# Patient Record
Sex: MALE | Race: WHITE | NOT HISPANIC OR LATINO | Employment: OTHER | URBAN - NONMETROPOLITAN AREA
[De-identification: names, ages, dates, MRNs, and addresses within clinical notes are randomized per-mention and may not be internally consistent; named-entity substitution may affect disease eponyms.]

---

## 2020-01-17 ENCOUNTER — HISTORICAL (OUTPATIENT)
Dept: ADMINISTRATIVE | Facility: HOSPITAL | Age: 65
End: 2020-01-17

## 2022-05-11 ENCOUNTER — HISTORICAL (OUTPATIENT)
Dept: ADMINISTRATIVE | Facility: HOSPITAL | Age: 67
End: 2022-05-11

## 2022-06-14 DIAGNOSIS — N20.0 LEFT NEPHROLITHIASIS: Primary | ICD-10-CM

## 2022-07-28 ENCOUNTER — HOSPITAL ENCOUNTER (OUTPATIENT)
Dept: RADIOLOGY | Facility: CLINIC | Age: 67
Discharge: HOME | End: 2022-07-28
Attending: NURSE PRACTITIONER
Payer: MEDICARE

## 2022-07-28 ENCOUNTER — OFFICE VISIT (OUTPATIENT)
Dept: UROLOGY | Facility: CLINIC | Age: 67
End: 2022-07-28
Payer: MEDICARE

## 2022-07-28 VITALS — WEIGHT: 161 LBS | RESPIRATION RATE: 20 BRPM | BODY MASS INDEX: 22.54 KG/M2 | HEIGHT: 71 IN

## 2022-07-28 DIAGNOSIS — N20.0 LEFT NEPHROLITHIASIS: ICD-10-CM

## 2022-07-28 PROCEDURE — 74018 XR ABDOMEN AP 1 VIEW: ICD-10-PCS | Mod: TC,,, | Performed by: UROLOGY

## 2022-07-28 PROCEDURE — 74018 XR ABDOMEN AP 1 VIEW: ICD-10-PCS | Mod: 26,,, | Performed by: RADIOLOGY

## 2022-07-28 PROCEDURE — 74018 RADEX ABDOMEN 1 VIEW: CPT | Mod: TC,,, | Performed by: UROLOGY

## 2022-07-28 PROCEDURE — 99203 OFFICE O/P NEW LOW 30 MIN: CPT | Mod: S$GLB,,, | Performed by: NURSE PRACTITIONER

## 2022-07-28 PROCEDURE — 99203 PR OFFICE/OUTPT VISIT, NEW, LEVL III, 30-44 MIN: ICD-10-PCS | Mod: S$GLB,,, | Performed by: NURSE PRACTITIONER

## 2022-07-28 PROCEDURE — 74018 RADEX ABDOMEN 1 VIEW: CPT | Mod: 26,,, | Performed by: RADIOLOGY

## 2022-07-28 RX ORDER — BUPROPION HYDROCHLORIDE 150 MG/1
TABLET ORAL
COMMUNITY
Start: 2022-04-05

## 2022-07-28 NOTE — PROGRESS NOTES
Subjective:       Patient ID: Roly Ortez is a 66 y.o. male.    Chief Complaint: New Pt and Renal stones - L      HPI: 66-year-old male new to the service seen today for left kidney stone.  Patient was identified with an upper pole left calyceal nonobstructing stone by CT scan June 11, 2022.  Review CT scan states that his comparison to previous studies in 2022 stone was identified remains unchanged.  Patient is asymptomatic.  He has spontaneously passed stones in his lifetime.  He has no voiding complaints at this time.  He seen no blood in the urine.  Denies dysuria frequency urgency or incontinence.  He has been afebrile.  Reports PCP follows PSA/ERCIA.  Patient is having some intermittent left lower back pain.  He has been to a chiropractor several times he does adjustments in his pain resolved.  He works as a  with a lot a heavy lifting and position changes.  He states he has about convinced that his pain is related to his back and not stone.  No back pain today       Past Medical History:   Past Medical History:   Diagnosis Date    BPH (benign prostatic hyperplasia)     COPD (chronic obstructive pulmonary disease)     GERD (gastroesophageal reflux disease)     Personal history of colonic polyps     Renal stones     Unspecified hemorrhoids        Past Surgical Historical:   Past Surgical History:   Procedure Laterality Date    CATARACT EXTRACTION Bilateral     COLONOSCOPY  07/01/2022        Medications:   Medication List with Changes/Refills   Current Medications    BUPROPION (WELLBUTRIN XL) 150 MG TB24 TABLET    TAKE 1 TABLET BY MOUTH DAILY with burpropion xl 300 MG.        Past Social History:   Social History     Socioeconomic History    Marital status:    Tobacco Use    Smoking status: Current Every Day Smoker    Smokeless tobacco: Never Used   Substance and Sexual Activity    Alcohol use: Not Currently       Allergies: Review of patient's allergies indicates:  No Known  Allergies     Family History:   Family History   Problem Relation Age of Onset    Cancer Father     Alzheimer's disease Mother         Review of Systems:  Review of Systems   Constitutional: Negative for activity change and appetite change.   HENT: Negative for congestion and dental problem.    Respiratory: Negative for chest tightness and shortness of breath.    Cardiovascular: Negative for chest pain.   Gastrointestinal: Negative for abdominal distention and abdominal pain.   Genitourinary: Negative for decreased urine volume, difficulty urinating, dysuria, enuresis, flank pain, frequency, genital sores, hematuria, penile discharge, penile pain, penile swelling, scrotal swelling, testicular pain and urgency.   Musculoskeletal: Positive for back pain (Intermittently). Negative for neck pain.   Neurological: Negative for dizziness.   Hematological: Negative for adenopathy.   Psychiatric/Behavioral: Negative for agitation, behavioral problems and confusion.       Physical Exam:  Physical Exam  Constitutional:       Appearance: He is well-developed.   HENT:      Head: Normocephalic.   Eyes:      General: No scleral icterus.  Pulmonary:      Effort: Pulmonary effort is normal.      Breath sounds: Normal breath sounds.   Abdominal:      General: There is no distension.      Palpations: Abdomen is soft.      Tenderness: There is no abdominal tenderness.      Hernia: No hernia is present. There is no hernia in the right inguinal area or left inguinal area.   Genitourinary:     Penis: Normal.       Testes: Normal. Cremasteric reflex is present.   Musculoskeletal:      Cervical back: Normal range of motion.   Skin:     General: Skin is warm and dry.   Neurological:      Mental Status: He is alert and oriented to person, place, and time.         Assessment/Plan:   Left renal calyceal stone--nonobstructing.  Urinalysis today is negative.  KUB stones identified again nonobstructing not cause of back pain.  Per radiology  report this has been stable since 2020. Monitor for progression    Back pain--follow-up with PCP/chiropractor   Problem List Items Addressed This Visit    None     Visit Diagnoses     Left nephrolithiasis        Relevant Orders    POCT Urinalysis (w/Micro Option)    X-Ray Abdomen AP 1 View (Completed)

## 2022-09-17 ENCOUNTER — HISTORICAL (OUTPATIENT)
Dept: ADMINISTRATIVE | Facility: HOSPITAL | Age: 67
End: 2022-09-17

## 2022-09-18 ENCOUNTER — HOSPITAL ENCOUNTER (EMERGENCY)
Facility: HOSPITAL | Age: 67
Discharge: HOME | End: 2022-09-19
Attending: EMERGENCY MEDICINE
Payer: MEDICARE

## 2022-09-18 DIAGNOSIS — R42 ACUTE SEVERE VERTIGO: Primary | ICD-10-CM

## 2022-09-18 PROCEDURE — 25000003 PHARM REV CODE 250: Performed by: EMERGENCY MEDICINE

## 2022-09-18 PROCEDURE — 99284 EMERGENCY DEPT VISIT MOD MDM: CPT | Mod: 25

## 2022-09-18 RX ORDER — DIAZEPAM 5 MG/1
5 TABLET ORAL
Status: COMPLETED | OUTPATIENT
Start: 2022-09-18 | End: 2022-09-18

## 2022-09-18 RX ORDER — DIAZEPAM 5 MG/1
5 TABLET ORAL EVERY 6 HOURS PRN
Qty: 15 TABLET | Refills: 0 | Status: SHIPPED | OUTPATIENT
Start: 2022-09-18 | End: 2022-09-23

## 2022-09-18 RX ADMIN — DIAZEPAM 5 MG: 5 TABLET ORAL at 10:09

## 2022-09-19 VITALS
DIASTOLIC BLOOD PRESSURE: 64 MMHG | HEIGHT: 71 IN | SYSTOLIC BLOOD PRESSURE: 110 MMHG | RESPIRATION RATE: 16 BRPM | WEIGHT: 166 LBS | BODY MASS INDEX: 23.24 KG/M2 | HEART RATE: 68 BPM | TEMPERATURE: 98 F | OXYGEN SATURATION: 92 %

## 2022-09-19 NOTE — ED PROVIDER NOTES
Encounter Date: 9/18/2022    SCRIBE #1 NOTE: I, Richi Chino, am scribing for, and in the presence of,  Dr. Molina. I have scribed the following portions of the note - Other sections scribed: HPI, ROS, Physical Exam, MDM, Attending.     History     Chief Complaint   Patient presents with    Dizziness     Dizziness x multiple days, CT negative, Needs MRI.      67 y/o male with history of COPD and GERD presents to ED for dizziness onset yesterday when he woke up.  Pt presented at [facility] and had unremarkable CT there, so was sent here for MRI.  He was given meclizine with no relief.  Pt describes the dizziness as room spinning that occurs while both standing and sitting.  He says it became worse today, and he has never had similar symptoms before.  He also complains of off balance gait and generalized weakness.  He denies nausea, HA, focal weakness, or vision changes.    The history is provided by the patient and the spouse.   Dizziness  This is a new problem. The current episode started yesterday. The problem occurs constantly. The problem has been gradually worsening. Pertinent negatives include no chest pain, no headaches and no shortness of breath. Nothing aggravates the symptoms. Nothing relieves the symptoms. Treatments tried: meclizine. The treatment provided no relief.   Review of patient's allergies indicates:  No Known Allergies  Past Medical History:   Diagnosis Date    BPH (benign prostatic hyperplasia)     COPD (chronic obstructive pulmonary disease)     GERD (gastroesophageal reflux disease)     Personal history of colonic polyps     Renal stones     Unspecified hemorrhoids      Past Surgical History:   Procedure Laterality Date    CATARACT EXTRACTION Bilateral     COLONOSCOPY  07/01/2022     Family History   Problem Relation Age of Onset    Cancer Father     Alzheimer's disease Mother      Social History     Tobacco Use    Smoking status: Every Day    Smokeless tobacco: Never   Substance Use Topics     Alcohol use: Not Currently     Review of Systems   Constitutional:  Negative for chills, diaphoresis and fever.        Generalized weakness   HENT:  Negative for congestion and sore throat.    Eyes:  Negative for visual disturbance.   Respiratory:  Negative for cough and shortness of breath.    Cardiovascular:  Negative for chest pain and palpitations.   Gastrointestinal:  Negative for diarrhea, nausea and vomiting.   Genitourinary:  Negative for dysuria and hematuria.   Skin:  Negative for rash.   Neurological:  Positive for dizziness. Negative for syncope, weakness, numbness and headaches.   All other systems reviewed and are negative.    Physical Exam     Initial Vitals [09/18/22 2103]   BP Pulse Resp Temp SpO2   (!) 148/80 63 16 98.2 °F (36.8 °C) 100 %      MAP       --         Physical Exam    Nursing note and vitals reviewed.  Constitutional: He appears well-developed and well-nourished. He is not diaphoretic. He does not appear ill. No distress.   HENT:   Head: Normocephalic and atraumatic.   Right Ear: External ear normal.   Left Ear: External ear normal.   Nose: Nose normal.   Mouth/Throat: Oropharynx is clear and moist.   Eyes: Conjunctivae and EOM are normal. Pupils are equal, round, and reactive to light.   Neck: Neck supple. No tracheal deviation present.   Cardiovascular:  Normal rate, regular rhythm, normal heart sounds and intact distal pulses.           No murmur heard.  Pulmonary/Chest: Breath sounds normal. No respiratory distress. He has no wheezes. He has no rhonchi. He has no rales.   Abdominal: Abdomen is soft. Bowel sounds are normal. He exhibits no distension. There is no abdominal tenderness.   No right CVA tenderness.  No left CVA tenderness.   Musculoskeletal:         General: No edema. Normal range of motion.      Cervical back: Neck supple.     Neurological: He is alert and oriented to person, place, and time. He has normal strength. No cranial nerve deficit or sensory deficit. GCS  score is 15. GCS eye subscore is 4. GCS verbal subscore is 5. GCS motor subscore is 6.   Mild dysmetria on L side during finger-to-nose   Skin: Skin is warm and dry. Capillary refill takes less than 2 seconds.   Psychiatric: He has a normal mood and affect. His mood appears not anxious.       ED Course   Procedures  Labs Reviewed - No data to display       Imaging Results              MRI Brain Without Contrast (Final result)  Result time 09/19/22 07:28:45      Final result by Bing Sutton MD (09/19/22 07:28:45)                   Impression:      1. No acute intracranial abnormality.  2. Mild chronic microvascular ischemic changes.  No significant change from the [facility] interpretation.      Electronically signed by: Bing Sutton  Date:    09/19/2022  Time:    07:28               Narrative:    EXAMINATION:  MRI BRAIN WITHOUT CONTRAST    CLINICAL HISTORY:  Dizziness, persistent/recurrent, cardiac or vascular cause suspected;    TECHNIQUE:  Multiplanar, multisequence MR images of the brain were obtained without the administration of intravenous contrast.    COMPARISON:  Outside CT head dated 09/17/2022    FINDINGS:  There is no restricted diffusion, hemorrhage or edema.  There are mild scattered T2/FLAIR hyperintensities in the subcortical and periventricular white matter, nonspecific but likely representing chronic microvascular ischemic changes.    There is no mass effect or midline shift.  The basal cisterns are patent.  There is mild diffuse parenchymal volume loss.  There is no hydrocephalus or abnormal extra-axial fluid collection.  The major intracranial flow voids are patent.  There is mild scattered paranasal sinus mucosal thickening.                        Preliminary result by Bing Sutton MD (09/18/22 23:00:02)                   Narrative:    START OF REPORT:  Technique: Multiplanar, multisequence magnetic resonance imaging of the brain was performed without intravenous  contrast.    Comparison: Correlation is with CT study dated â2022-09-17 16:02:42â.    Clinical history: Dizziness.    Findings:  Hemorrhage: No acute intracranial hemorrhage is identified.  Stroke: No abnormal signal is identified on the diffusion images to suggest acute infarct.  Extra axial spaces: The ventricles and sulci and basal cisterns all appear mildly prominent suggesting an element of global cerebral atrophy.  Cerebral, cerebellar, and brainstem parenchyma: Mild scattered periventricular and subcortical white matter signal abnormalities are seen. The main consideration is small vessel ischemic changes.  Cranial nerves: Normal.  Herniation: None.  Calvarium: Within normal limits.  Vascular system: Normal flow voids.  Visualized paranasal sinuses: Mild bilateral ethmoid sinusitis is seen.  Visualized orbits: The visualized orbits appear unremarkable.  Visualized upper cervical spine: Normal.  Sella and skull base: Normal.  Temporal bones and mastoids: Within normal limits.      Impression:  1. No acute intracranial process is identified. Details and other findings as discussed above.                          Preliminary result by Aristides Be MD (09/18/22 23:00:02)                   Narrative:    START OF REPORT:  Technique: Multiplanar, multisequence magnetic resonance imaging of the brain was performed without intravenous contrast.    Comparison: Correlation is with CT study dated â2022-09-17 16:02:42â.    Clinical history: Dizziness.    Findings:  Hemorrhage: No acute intracranial hemorrhage is identified.  Stroke: No abnormal signal is identified on the diffusion images to suggest acute infarct.  Extra axial spaces: The ventricles and sulci and basal cisterns all appear mildly prominent suggesting an element of global cerebral atrophy.  Cerebral, cerebellar, and brainstem parenchyma: Mild scattered periventricular and subcortical white matter signal abnormalities are seen. The main  consideration is small vessel ischemic changes.  Cranial nerves: Normal.  Herniation: None.  Calvarium: Within normal limits.  Vascular system: Normal flow voids.  Visualized paranasal sinuses: Mild bilateral ethmoid sinusitis is seen.  Visualized orbits: The visualized orbits appear unremarkable.  Visualized upper cervical spine: Normal.  Sella and skull base: Normal.  Temporal bones and mastoids: Within normal limits.      Impression:  1. No acute intracranial process is identified. Details and other findings as discussed above.                                         Medications   diazePAM tablet 5 mg (5 mg Oral Given 9/18/22 2201)     Medical Decision Making:   Clinical Tests:   Lab Tests: Ordered and Reviewed  Radiological Study: Ordered and Reviewed        Scribe Attestation:   Scribe #1: I performed the above scribed service and the documentation accurately describes the services I performed. I attest to the accuracy of the note.    Attending Attestation:           Physician Attestation for Scribe:  Physician Attestation Statement for Scribe #1: I, reviewed documentation, as scribed by Richi Chino in my presence, and it is both accurate and complete.           ED Course as of 09/22/22 0540   Sun Sep 18, 2022   2330 Discussed normal MRI results with patient and wife.  We discussed that this was likely a severe case of vertigo and offered admission for ENT evaluation as well as symptom control.  Patient and wife were both further be discharged home to follow-up with his neurologist and primary care physician in [place]. [KM]      ED Course User Index  [KM] Licha Molina MD                 Clinical Impression:   Final diagnoses:  [R42] Acute severe vertigo (Primary)      ED Disposition Condition    Discharge Stable          ED Prescriptions       Medication Sig Dispense Start Date End Date Auth. Provider    diazePAM (VALIUM) 5 MG tablet Take 1 tablet (5 mg total) by mouth every 6 (six) hours as needed  (dizziness). 15 tablet 9/18/2022 9/23/2022 Licha Molina MD          Follow-up Information       Follow up With Specialties Details Why Contact Info    Syeda Adkins MD Family Medicine Call in 1 day also call your neurologist and ENT [address] [facility] [address]  [phone]      [facility] - Emergency Dept Emergency Medicine  As needed, If symptoms worsen [address] [facility] [id number] [phone]             Licha Molina MD  09/22/22 0524

## 2023-05-16 ENCOUNTER — PATIENT MESSAGE (OUTPATIENT)
Dept: RESEARCH | Facility: HOSPITAL | Age: 68
End: 2023-05-16
Payer: MEDICARE

## 2023-08-03 ENCOUNTER — OFFICE VISIT (OUTPATIENT)
Dept: UROLOGY | Facility: CLINIC | Age: 68
End: 2023-08-03
Payer: MEDICARE

## 2023-08-03 ENCOUNTER — TELEPHONE (OUTPATIENT)
Dept: UROLOGY | Facility: CLINIC | Age: 68
End: 2023-08-03

## 2023-08-03 VITALS
BODY MASS INDEX: 22.96 KG/M2 | SYSTOLIC BLOOD PRESSURE: 123 MMHG | WEIGHT: 164 LBS | RESPIRATION RATE: 12 BRPM | HEIGHT: 71 IN | DIASTOLIC BLOOD PRESSURE: 69 MMHG | HEART RATE: 64 BPM

## 2023-08-03 DIAGNOSIS — N40.0 BENIGN PROSTATIC HYPERPLASIA, UNSPECIFIED WHETHER LOWER URINARY TRACT SYMPTOMS PRESENT: Primary | ICD-10-CM

## 2023-08-03 DIAGNOSIS — N20.0 LEFT NEPHROLITHIASIS: ICD-10-CM

## 2023-08-03 LAB
BILIRUBIN, UA POC OHS: NEGATIVE
BLOOD, UA POC OHS: NEGATIVE
CLARITY, UA POC OHS: ABNORMAL
COLOR, UA POC OHS: YELLOW
GLUCOSE, UA POC OHS: NEGATIVE
KETONES, UA POC OHS: NEGATIVE
LEUKOCYTES, UA POC OHS: NEGATIVE
NITRITE, UA POC OHS: NEGATIVE
PH, UA POC OHS: 5.5
PROTEIN, UA POC OHS: NEGATIVE
PSA, DIAGNOSTIC: 1.05 NG/ML (ref 0.1–4)
SPECIFIC GRAVITY, UA POC OHS: >=1.03
UROBILINOGEN, UA POC OHS: 0.2

## 2023-08-03 PROCEDURE — 99214 OFFICE O/P EST MOD 30 MIN: CPT | Mod: S$GLB,,, | Performed by: NURSE PRACTITIONER

## 2023-08-03 PROCEDURE — 81003 POCT URINALYSIS(INSTRUMENT): ICD-10-PCS | Mod: QW,S$GLB,, | Performed by: NURSE PRACTITIONER

## 2023-08-03 PROCEDURE — 81003 URINALYSIS AUTO W/O SCOPE: CPT | Mod: QW,S$GLB,, | Performed by: NURSE PRACTITIONER

## 2023-08-03 PROCEDURE — 99214 PR OFFICE/OUTPT VISIT, EST, LEVL IV, 30-39 MIN: ICD-10-PCS | Mod: S$GLB,,, | Performed by: NURSE PRACTITIONER

## 2023-08-03 RX ORDER — BUPROPION HYDROCHLORIDE 300 MG/1
TABLET ORAL
COMMUNITY
Start: 2023-07-19

## 2023-08-03 RX ORDER — AMANTADINE HYDROCHLORIDE 100 MG/1
CAPSULE, GELATIN COATED ORAL
COMMUNITY
Start: 2023-06-06

## 2023-08-03 RX ORDER — NAPROXEN SODIUM 220 MG/1
81 TABLET, FILM COATED ORAL DAILY
COMMUNITY

## 2023-08-03 RX ORDER — PRIMIDONE 50 MG/1
25 TABLET ORAL NIGHTLY
COMMUNITY
Start: 2023-07-21

## 2023-08-03 RX ORDER — MECLIZINE HCL 12.5 MG 12.5 MG/1
TABLET ORAL
COMMUNITY
Start: 2023-07-24

## 2023-08-03 RX ORDER — TERBINAFINE HYDROCHLORIDE 250 MG/1
TABLET ORAL
COMMUNITY
Start: 2023-07-19

## 2023-08-03 NOTE — TELEPHONE ENCOUNTER
Called pt and let him know of the lab results all came back within normal limits. sj    ----- Message from Mirza Roman NP sent at 8/3/2023  1:14 PM CDT -----  Please notify patient of lab results within stable range

## 2023-08-03 NOTE — PROGRESS NOTES
Subjective:       Patient ID: Roly Ortez is a 67 y.o. male.    Chief Complaint: Nephrolithiasis (Yrly/no issues with stones this past year)      HPI: 67-year-old male 1 year follow-up history kidney stones.  He is had no recurrence symptoms since his last office evaluation.  He is voiding without complaint.  Patient is due PSA/ERICA he has an uncle on his father's side who had prostate cancer.         Past Medical History:   Past Medical History:   Diagnosis Date    Anxiety disorder, unspecified     BPH (benign prostatic hyperplasia)     COPD (chronic obstructive pulmonary disease)     GERD (gastroesophageal reflux disease)     Personal history of colonic polyps     Renal stones     Unspecified hemorrhoids        Past Surgical Historical:   Past Surgical History:   Procedure Laterality Date    CATARACT EXTRACTION Bilateral     COLONOSCOPY  07/01/2022        Medications:   Medication List with Changes/Refills   Current Medications    AMANTADINE HCL (SYMMETREL) 100 MG CAPSULE    take 1 capsule Orally twice daily 90 DAYS]    ASPIRIN 81 MG CHEW    Take 81 mg by mouth once daily.    BUPROPION (WELLBUTRIN XL) 150 MG TB24 TABLET    TAKE 1 TABLET BY MOUTH DAILY with burpropion xl 300 MG.    BUPROPION (WELLBUTRIN XL) 300 MG 24 HR TABLET    take 1 tablet in THE morning Orally ONCE A DAY with Bupropion xl 150 MG. 90    DIAZEPAM (VALIUM) 5 MG TABLET    Take 1 tablet (5 mg total) by mouth every 6 (six) hours as needed (dizziness).    MECLIZINE (ANTIVERT) 12.5 MG TABLET    take 1 tablet as needed Orally every 8 hours as needed for 10 days    PRIMIDONE (MYSOLINE) 50 MG TAB    Take 25 mg by mouth every evening.    TERBINAFINE HCL (LAMISIL) 250 MG TABLET    Take 1 tablet Orally Once a day 90 days        Past Social History:   Social History     Socioeconomic History    Marital status:    Tobacco Use    Smoking status: Every Day     Current packs/day: 0.00    Smokeless tobacco: Never   Substance and Sexual Activity     Alcohol use: Not Currently       Allergies: Review of patient's allergies indicates:  No Known Allergies     Family History:   Family History   Problem Relation Age of Onset    Cancer Father     Alzheimer's disease Mother     Parkinsonism Mother         Review of Systems:  Review of Systems   Constitutional:  Negative for activity change and appetite change.   HENT:  Negative for congestion and dental problem.    Eyes:  Negative for visual disturbance.   Respiratory:  Negative for chest tightness and shortness of breath.    Cardiovascular:  Negative for chest pain.   Gastrointestinal:  Negative for abdominal distention and abdominal pain.   Genitourinary:  Negative for decreased urine volume, difficulty urinating, dysuria, enuresis, flank pain, frequency, genital sores, hematuria, penile discharge, penile pain, penile swelling, scrotal swelling, testicular pain and urgency.   Musculoskeletal:  Negative for back pain and neck pain.   Skin:  Negative for color change.   Neurological:  Negative for dizziness.   Hematological:  Negative for adenopathy.   Psychiatric/Behavioral:  Negative for agitation, behavioral problems and confusion.        Physical Exam:  Physical Exam  Constitutional:       Appearance: He is well-developed.   HENT:      Head: Normocephalic.   Eyes:      General: No scleral icterus.  Pulmonary:      Effort: Pulmonary effort is normal.      Breath sounds: Normal breath sounds.   Abdominal:      General: There is no distension.      Palpations: Abdomen is soft.      Tenderness: There is no abdominal tenderness.      Hernia: No hernia is present. There is no hernia in the right inguinal area or left inguinal area.   Genitourinary:     Penis: Normal.       Testes: Normal. Cremasteric reflex is present.      Prostate: Normal.   Musculoskeletal:      Cervical back: Normal range of motion.   Skin:     General: Skin is warm and dry.   Neurological:      Mental Status: He is alert and oriented to person,  place, and time.         Assessment/Plan:   Kidney stone by history--non recurrence symptoms.  No x-ray available today.  Urinalysis is negative    BPH without--PSA today prostate exam was benign feeling.  Problem List Items Addressed This Visit    None  Visit Diagnoses       Left nephrolithiasis    -  Primary    Relevant Orders    POCT Urinalysis(Instrument)

## 2024-10-02 ENCOUNTER — TELEPHONE (OUTPATIENT)
Dept: SURGERY | Facility: CLINIC | Age: 69
End: 2024-10-02
Payer: MEDICARE

## 2024-10-02 DIAGNOSIS — K40.30 INGUINAL HERNIA WITH OBSTRUCTION WITHOUT GANGRENE, RECURRENCE NOT SPECIFIED, UNSPECIFIED LATERALITY: Primary | ICD-10-CM

## 2024-10-02 NOTE — TELEPHONE ENCOUNTER
----- Message from Adilene sent at 10/2/2024  8:18 AM CDT -----  Regarding: Referral  Contact: Lorena, daughter in law  Per phone call with Jaycee, she stated that Dr Syeda Adkins sent over a referral on yesterday to have the patient to be schedule for an appointment regarding a possible hernia.  Please return call at [phone].    Thanks,  SJ    Pt was contacted and told that he can come in on 10/9  at 014

## 2024-10-09 ENCOUNTER — OFFICE VISIT (OUTPATIENT)
Dept: SURGERY | Facility: CLINIC | Age: 69
End: 2024-10-09
Payer: MEDICARE

## 2024-10-09 DIAGNOSIS — K40.90 INGUINAL HERNIA OF RIGHT SIDE WITHOUT OBSTRUCTION OR GANGRENE: Primary | ICD-10-CM

## 2024-10-09 LAB
ANION GAP SERPL CALC-SCNC: 7 MMOL/L (ref 3–11)
BASOPHILS NFR BLD: 0.9 % (ref 0–3)
BUN SERPL-MCNC: 12 MG/DL (ref 7–18)
BUN/CREAT SERPL: 11.42 RATIO (ref 7–18)
CALCIUM SERPL-MCNC: 9.8 MG/DL (ref 8.8–10.5)
CHLORIDE SERPL-SCNC: 103 MMOL/L (ref 100–108)
CO2 SERPL-SCNC: 29 MMOL/L (ref 21–32)
CREAT SERPL-MCNC: 1.05 MG/DL (ref 0.7–1.3)
EOSINOPHIL NFR BLD: 3.8 % (ref 1–3)
ERYTHROCYTE [DISTWIDTH] IN BLOOD BY AUTOMATED COUNT: 13.2 % (ref 12.5–18)
GFR ESTIMATION: > 60
GLUCOSE SERPL-MCNC: 92 MG/DL (ref 70–110)
HCT VFR BLD AUTO: 50.2 % (ref 42–52)
HGB BLD-MCNC: 17 G/DL (ref 14–18)
LYMPHOCYTES NFR BLD: 25.8 % (ref 25–40)
MCH RBC QN AUTO: 31.7 PG (ref 27–31.2)
MCHC RBC AUTO-ENTMCNC: 33.9 G/DL (ref 31.8–35.4)
MCV RBC AUTO: 93.7 FL (ref 80–97)
MONOCYTES NFR BLD: 9.8 % (ref 1–15)
NEUTROPHILS # BLD AUTO: 3.73 10*3/UL (ref 1.8–7.7)
NEUTROPHILS NFR BLD: 59.1 % (ref 37–80)
NUCLEATED RED BLOOD CELLS: 0 %
PLATELETS: 210 10*3/UL (ref 142–424)
POTASSIUM SERPL-SCNC: 4.5 MMOL/L (ref 3.6–5.2)
RBC # BLD AUTO: 5.36 10*6/UL (ref 4.7–6.1)
SODIUM BLD-SCNC: 139 MMOL/L (ref 135–145)
WBC # BLD: 6.3 10*3/UL (ref 4.6–10.2)

## 2024-10-09 PROCEDURE — 99203 OFFICE O/P NEW LOW 30 MIN: CPT | Mod: S$PBB,,, | Performed by: SURGERY

## 2024-10-09 RX ORDER — CIPROFLOXACIN 500 MG/1
TABLET ORAL
COMMUNITY
Start: 2024-10-07

## 2024-10-09 RX ORDER — DICLOFENAC POTASSIUM 50 MG/1
TABLET, FILM COATED ORAL
COMMUNITY

## 2024-10-09 RX ORDER — DICYCLOMINE HYDROCHLORIDE 20 MG/1
TABLET ORAL
COMMUNITY
Start: 2024-10-02

## 2024-10-09 RX ORDER — TAMSULOSIN HYDROCHLORIDE 0.4 MG/1
1 CAPSULE ORAL
COMMUNITY

## 2024-10-09 NOTE — PROGRESS NOTES
History & Physical    SUBJECTIVE:     History of Present Illness:    69-year-old male with 3 week history of right inguinal bulge.  Does have some discomfort and tenderness.  Reduces spontaneously when lying flat.  Patient is a  and does a lot of heavy lifting.    No chief complaint on file.        Review of patient's allergies indicates:  Review of patient's allergies indicates:  No Known Allergies    Current Outpatient Medications on File Prior to Visit   Medication Sig Dispense Refill    amantadine HCL (SYMMETREL) 100 mg capsule take 1 capsule Orally twice daily 90 DAYS]      aspirin 81 MG Chew Take 81 mg by mouth once daily.      buPROPion (WELLBUTRIN XL) 150 MG TB24 tablet TAKE 1 TABLET BY MOUTH DAILY with burpropion xl 300 MG.      buPROPion (WELLBUTRIN XL) 300 MG 24 hr tablet take 1 tablet in THE morning Orally ONCE A DAY with Bupropion xl 150 MG. 90      ciprofloxacin HCl (CIPRO) 500 MG tablet 1 tablet Orally every 12 hrs for 10 days      diclofenac (CATAFLAM) 50 MG tablet 1 tablet with food or milk as needed Orally Twice a day      dicyclomine (BENTYL) 20 mg tablet Take 1 Tablet by mouth four times daily FOR cramping      meclizine (ANTIVERT) 12.5 mg tablet take 1 tablet as needed Orally every 8 hours as needed for 10 days      primidone (MYSOLINE) 50 MG Tab Take 25 mg by mouth every evening.      tamsulosin (FLOMAX) 0.4 mg Cap 1 capsule.      terbinafine HCL (LAMISIL) 250 mg tablet Take 1 tablet Orally Once a day 90 days      diazePAM (VALIUM) 5 MG tablet Take 1 tablet (5 mg total) by mouth every 6 (six) hours as needed (dizziness). 15 tablet 0     No current facility-administered medications on file prior to visit.       Past Medical History:   Diagnosis Date    Anxiety disorder, unspecified     BPH (benign prostatic hyperplasia)     COPD (chronic obstructive pulmonary disease)     GERD (gastroesophageal reflux disease)     Personal history of colonic polyps     Renal stones     Unspecified  hemorrhoids      Past Surgical History:   Procedure Laterality Date    CATARACT EXTRACTION Bilateral     COLONOSCOPY  07/01/2022     Family History   Problem Relation Name Age of Onset    Cancer Father      Alzheimer's disease Mother      Parkinsonism Mother         Social History     Socioeconomic History    Marital status:    Tobacco Use    Smoking status: Every Day    Smokeless tobacco: Never   Substance and Sexual Activity    Alcohol use: Not Currently    Drug use: Never          Review of Systems   Constitutional: Negative.    Respiratory: Negative.     Cardiovascular: Negative.    Gastrointestinal: Negative.    Genitourinary: Negative.    Musculoskeletal:  Positive for joint pain.   Neurological:  Positive for dizziness and tremors.   Endo/Heme/Allergies: Negative.    Psychiatric/Behavioral: Negative.         OBJECTIVE:     There were no vitals filed for this visit.              Physical Exam:  Physical Exam  Constitutional:       Appearance: Normal appearance. He is normal weight.   HENT:      Head: Normocephalic and atraumatic.   Cardiovascular:      Rate and Rhythm: Normal rate and regular rhythm.   Pulmonary:      Effort: Pulmonary effort is normal.      Breath sounds: Normal breath sounds.   Abdominal:      General: Abdomen is flat. Bowel sounds are normal.      Palpations: Abdomen is soft.      Hernia: A hernia (right inguinal hernia noted with weakness in the floor of the inguinal canal.  Reducible) is present.   Musculoskeletal:         General: No swelling. Normal range of motion.      Cervical back: Normal range of motion.   Skin:     General: Skin is warm and dry.   Neurological:      General: No focal deficit present.      Mental Status: He is alert and oriented to person, place, and time.      Cranial Nerves: No cranial nerve deficit.   Psychiatric:         Mood and Affect: Mood normal.         Behavior: Behavior normal.             ASSESSMENT/PLAN:   Right inguinal hernia, nonrecurrent,  reducible  PLAN:  Right inguinal hernia repair with mesh scheduled October 15th, outpatient day surgery.  Procedure, risk and benefits discussed

## 2024-10-15 ENCOUNTER — OUTSIDE PLACE OF SERVICE (OUTPATIENT)
Dept: SURGERY | Facility: CLINIC | Age: 69
End: 2024-10-15

## 2024-10-23 ENCOUNTER — OFFICE VISIT (OUTPATIENT)
Dept: SURGERY | Facility: CLINIC | Age: 69
End: 2024-10-23
Payer: MEDICARE

## 2024-10-23 DIAGNOSIS — Z98.890 POST-OPERATIVE STATE: Primary | ICD-10-CM

## 2024-10-23 PROCEDURE — 99024 POSTOP FOLLOW-UP VISIT: CPT | Mod: POP,,, | Performed by: SURGERY

## 2024-10-23 NOTE — PROGRESS NOTES
HPI:  Postop right inguinal hernia repair.  Patient still complains of some postoperative soreness requiring narcotics.    PHYSICAL EXAM:  Incision is clean and dry with no significant redness or swelling.  Subcuticular suture removed  ASSESSMENT:    Stable postop for satisfactory healing  PLAN:      Revisit PRN.  Refill Norco 7.5 mg 18.  Heavy Lifting restrictions advised for total of 6 weeks postoperatively.